# Patient Record
Sex: MALE | Race: WHITE | URBAN - METROPOLITAN AREA
[De-identification: names, ages, dates, MRNs, and addresses within clinical notes are randomized per-mention and may not be internally consistent; named-entity substitution may affect disease eponyms.]

---

## 2022-07-22 ENCOUNTER — OFFICE VISIT (OUTPATIENT)
Dept: URBAN - METROPOLITAN AREA CLINIC 96 | Facility: CLINIC | Age: 22
End: 2022-07-22

## 2022-08-10 ENCOUNTER — WEB ENCOUNTER (OUTPATIENT)
Dept: URBAN - METROPOLITAN AREA CLINIC 111 | Facility: CLINIC | Age: 22
End: 2022-08-10

## 2022-08-10 ENCOUNTER — DASHBOARD ENCOUNTERS (OUTPATIENT)
Age: 22
End: 2022-08-10

## 2022-08-10 ENCOUNTER — OFFICE VISIT (OUTPATIENT)
Dept: URBAN - METROPOLITAN AREA CLINIC 111 | Facility: CLINIC | Age: 22
End: 2022-08-10
Payer: COMMERCIAL

## 2022-08-10 VITALS
TEMPERATURE: 97.7 F | SYSTOLIC BLOOD PRESSURE: 116 MMHG | DIASTOLIC BLOOD PRESSURE: 76 MMHG | HEIGHT: 70 IN | BODY MASS INDEX: 31.61 KG/M2 | HEART RATE: 88 BPM | WEIGHT: 220.8 LBS

## 2022-08-10 DIAGNOSIS — K92.1 BLOOD IN STOOL: ICD-10-CM

## 2022-08-10 DIAGNOSIS — K21.9 GASTROESOPHAGEAL REFLUX DISEASE, UNSPECIFIED WHETHER ESOPHAGITIS PRESENT: ICD-10-CM

## 2022-08-10 DIAGNOSIS — E66.9 OBESITY (BMI 30.0-34.9): ICD-10-CM

## 2022-08-10 DIAGNOSIS — K59.09 CHRONIC CONSTIPATION: ICD-10-CM

## 2022-08-10 PROBLEM — 414916001 OBESITY: Status: ACTIVE | Noted: 2022-08-10

## 2022-08-10 PROCEDURE — 99204 OFFICE O/P NEW MOD 45 MIN: CPT | Performed by: NURSE PRACTITIONER

## 2022-08-10 NOTE — HPI-TODAY'S VISIT:
23 yo male presents with mom for acid reflux sxs. Reports acid reflux since young, sxs exacerbated after cruise trip few months ago, taking pantoprazole 40mg daily with some relief but still has regurgitation, throat and heart burn. He lost about 20lb since then which is helping. Denies nausea, vomiting, dysphagia, or odynophagia. Has occasional abd pain with diarrhea. Usually constipated as he doesn't drink enough water at work. Has hx hemorrhoids, intermittent blood in stool for years. Deshawn fh ibd or gi malignancy. No prior egd/colonoscopy. Denies prior difficulty with anesthesia. No heart or lung diseases. Denies hx anemia, Sees pcp for RHCM.

## 2022-09-01 PROBLEM — 235595009: Status: ACTIVE | Noted: 2022-08-10

## 2022-09-01 PROBLEM — 236069009: Status: ACTIVE | Noted: 2022-09-01

## 2022-11-02 ENCOUNTER — OFFICE VISIT (OUTPATIENT)
Dept: URBAN - METROPOLITAN AREA LAB 3 | Facility: LAB | Age: 22
End: 2022-11-02